# Patient Record
Sex: FEMALE | Race: WHITE | Employment: OTHER | URBAN - METROPOLITAN AREA
[De-identification: names, ages, dates, MRNs, and addresses within clinical notes are randomized per-mention and may not be internally consistent; named-entity substitution may affect disease eponyms.]

---

## 2017-07-24 ENCOUNTER — TRANSFERRED RECORDS (OUTPATIENT)
Dept: HEALTH INFORMATION MANAGEMENT | Facility: CLINIC | Age: 57
End: 2017-07-24

## 2017-09-26 ENCOUNTER — MEDICAL CORRESPONDENCE (OUTPATIENT)
Dept: HEALTH INFORMATION MANAGEMENT | Facility: CLINIC | Age: 57
End: 2017-09-26

## 2017-12-26 ENCOUNTER — TRANSFERRED RECORDS (OUTPATIENT)
Dept: HEALTH INFORMATION MANAGEMENT | Facility: CLINIC | Age: 57
End: 2017-12-26

## 2018-03-02 ENCOUNTER — TRANSFERRED RECORDS (OUTPATIENT)
Dept: HEALTH INFORMATION MANAGEMENT | Facility: CLINIC | Age: 58
End: 2018-03-02

## 2018-06-20 ENCOUNTER — TRANSFERRED RECORDS (OUTPATIENT)
Dept: HEALTH INFORMATION MANAGEMENT | Facility: CLINIC | Age: 58
End: 2018-06-20

## 2018-07-20 ENCOUNTER — TRANSFERRED RECORDS (OUTPATIENT)
Dept: HEALTH INFORMATION MANAGEMENT | Facility: CLINIC | Age: 58
End: 2018-07-20

## 2018-10-24 ENCOUNTER — TRANSFERRED RECORDS (OUTPATIENT)
Dept: HEALTH INFORMATION MANAGEMENT | Facility: CLINIC | Age: 58
End: 2018-10-24

## 2018-11-16 ENCOUNTER — MEDICAL CORRESPONDENCE (OUTPATIENT)
Dept: HEALTH INFORMATION MANAGEMENT | Facility: CLINIC | Age: 58
End: 2018-11-16

## 2018-11-16 ENCOUNTER — TRANSFERRED RECORDS (OUTPATIENT)
Dept: HEALTH INFORMATION MANAGEMENT | Facility: CLINIC | Age: 58
End: 2018-11-16

## 2018-12-06 ENCOUNTER — MEDICAL CORRESPONDENCE (OUTPATIENT)
Dept: HEALTH INFORMATION MANAGEMENT | Facility: CLINIC | Age: 58
End: 2018-12-06

## 2019-01-03 ENCOUNTER — PATIENT OUTREACH (OUTPATIENT)
Dept: CARE COORDINATION | Facility: CLINIC | Age: 59
End: 2019-01-03

## 2019-03-21 ENCOUNTER — OFFICE VISIT (OUTPATIENT)
Dept: NEUROSURGERY | Facility: CLINIC | Age: 59
End: 2019-03-21
Payer: MEDICARE

## 2019-03-21 VITALS
HEART RATE: 98 BPM | WEIGHT: 195.4 LBS | TEMPERATURE: 98 F | OXYGEN SATURATION: 97 % | HEIGHT: 68 IN | RESPIRATION RATE: 18 BRPM | DIASTOLIC BLOOD PRESSURE: 83 MMHG | SYSTOLIC BLOOD PRESSURE: 146 MMHG | BODY MASS INDEX: 29.61 KG/M2

## 2019-03-21 DIAGNOSIS — M51.16 LUMBAR DISC DISEASE WITH RADICULOPATHY: Primary | ICD-10-CM

## 2019-03-21 PROBLEM — M50.10 CERVICAL DISC DISORDER WITH RADICULOPATHY OF CERVICAL REGION: Status: ACTIVE | Noted: 2017-08-04

## 2019-03-21 PROBLEM — Z98.1 S/P LUMBAR FUSION: Status: ACTIVE | Noted: 2018-06-01

## 2019-03-21 PROBLEM — E04.1 THYROID NODULE: Status: ACTIVE | Noted: 2018-11-16

## 2019-03-21 PROBLEM — J44.9 COPD (CHRONIC OBSTRUCTIVE PULMONARY DISEASE) (H): Status: ACTIVE | Noted: 2018-11-16

## 2019-03-21 PROBLEM — G47.33 OSA (OBSTRUCTIVE SLEEP APNEA): Status: ACTIVE | Noted: 2018-11-16

## 2019-03-21 PROBLEM — K58.9 IBS (IRRITABLE BOWEL SYNDROME): Status: ACTIVE | Noted: 2018-11-16

## 2019-03-21 PROBLEM — R49.0 HOARSENESS: Status: ACTIVE | Noted: 2018-11-16

## 2019-03-21 RX ORDER — ALBUTEROL SULFATE 1.25 MG/3ML
SOLUTION RESPIRATORY (INHALATION) PRN
COMMUNITY

## 2019-03-21 RX ORDER — ALBUTEROL SULFATE 90 UG/1
2 AEROSOL, METERED RESPIRATORY (INHALATION) 4 TIMES DAILY
COMMUNITY

## 2019-03-21 RX ORDER — TRIAMCINOLONE ACETONIDE 1 MG/G
CREAM TOPICAL PRN
COMMUNITY

## 2019-03-21 RX ORDER — METRONIDAZOLE 500 MG/1
500 TABLET ORAL PRN
COMMUNITY

## 2019-03-21 RX ORDER — ESTRADIOL 10 UG/1
10 INSERT VAGINAL PRN
COMMUNITY

## 2019-03-21 RX ORDER — SULFAMETHOXAZOLE/TRIMETHOPRIM 800-160 MG
1 TABLET ORAL PRN
COMMUNITY

## 2019-03-21 SDOH — HEALTH STABILITY: MENTAL HEALTH: HOW OFTEN DO YOU HAVE A DRINK CONTAINING ALCOHOL?: NEVER

## 2019-03-21 ASSESSMENT — MIFFLIN-ST. JEOR: SCORE: 1516.89

## 2019-03-21 ASSESSMENT — PAIN SCALES - GENERAL: PAINLEVEL: NO PAIN (0)

## 2019-03-21 NOTE — LETTER
3/21/2019       RE: Lory Duncan  [address]  [facility] [address]     Dear Colleague,    Thank you for referring your patient, Lory Duncan, to the [facility] NEUROSURGERY at [facility]. Please see a copy of my visit note below.      Service Date: 03/21/2019      CHIEF COMPLAINT:  Leg pain, status post lumbar surgery.      HISTORY OF PRESENT ILLNESS:  Ms. Duncan is a 58-year-old woman and heavy smoker who has a history of lateral interbody fusion at L5-S1 level done by Dr. Nice on 03/03/2018.  The surgery was done for right leg radiculopathy.  Immediately after the surgery, she had left leg pain which has been persistent ever since.  The pain goes along the L5 distribution.  She also reports numbness involving all of the foot.  She underwent an MRI and then a CT myelogram for evaluation of her pain and was seen by Dr. Nice.  She was told that her hardware was intact and that she was not a surgical candidate. The patient was then referred to Neurosurgery at [facility] for further evaluation.  She denies any bowel or bladder issues.  She is very debilitated because of her pain and is very frustrated.      PAST MEDICAL HISTORY:   1.  COPD.   2.  Degenerative joint disease.     3.  Neck pain.   4.  Sleep apnea.   5.  Thyroid disease.      PAST SURGICAL HISTORY:   1.  Hysterectomy.   2.  Laryngoscopy.   3.  Previous back surgery.      MEDICATIONS:   1.  Flexeril.   2. Hydrocodone.   3.  Albuterol.      PHYSICAL EXAMINATION:  The patient is awake, alert and oriented x3.  Cranial nerves are grossly intact.  She has 5/5 upper and lower extremity strength bilaterally.  Sensation reveals numbness in the left foot.  Reflexes are physiologic and symmetric.  Gait is within normal limits.  She has 3 incisions all healed.        IMAGING: CT myelogram on 10/24/18 reveals postsurgical changes at L5-S1. There's a fragment at the left facet joint in proximity to the L5 nerve root. This could  be a bone fragment or disk material.    ASSESSMENT/PLAN:  Ms. Mccoy is a 58 year old woman with left L5 radiculopathy after an L5-S1 OLIF. We discussed that her pain is most likely related to the fragment seen on CT myelogram. We would consider doing a transforaminal selective nerve block of left L5 to confirm the diagnosis. Potentially, she may benefit from surgery which will involve removal of left sided instrumentation including rods and screws, total facetectomy, and replacement of the instrumentation. Patient agrees to proceed. She'll come back to clinic once her nerve root block is performed.     Addendum:  I have seen this patient and agree with this note. JOHNSON JACOBSEN MD       As dictated by WILFRID ALFORD MD            D: 2019   T: 2019   MT: lorna      Name:     HERON MCCOY   MRN:      [id number]        Account:      [id number]   :      1960           Service Date: 2019      Document: [id number]

## 2019-03-21 NOTE — NURSING NOTE
Chief Complaint   Patient presents with     New Patient     [facility] NEW PATIENT CONSULTATION VISIT FOR POST OP COMPLICATIONS        Abdi Wu MA

## 2019-03-21 NOTE — PROGRESS NOTES
Service Date: 03/21/2019      CHIEF COMPLAINT:  Leg pain, status post lumbar surgery.      HISTORY OF PRESENT ILLNESS:  Ms. Duncan is a 58-year-old woman and heavy smoker who has a history of lateral interbody fusion at L5-S1 level done by Dr. Nice on 03/03/2018.  The surgery was done for right leg radiculopathy.  Immediately after the surgery, she had left leg pain which has been persistent ever since.  The pain goes along the L5 distribution.  She also reports numbness involving all of the foot.  She underwent an MRI and then a CT myelogram for evaluation of her pain and was seen by Dr. Nice.  She was told that her hardware was intact and that she was not a surgical candidate. The patient was then referred to Neurosurgery at [facility] for further evaluation.  She denies any bowel or bladder issues.  She is very debilitated because of her pain and is very frustrated.      PAST MEDICAL HISTORY:   1.  COPD.   2.  Degenerative joint disease.     3.  Neck pain.   4.  Sleep apnea.   5.  Thyroid disease.      PAST SURGICAL HISTORY:   1.  Hysterectomy.   2.  Laryngoscopy.   3.  Previous back surgery.      MEDICATIONS:   1.  Flexeril.   2. Hydrocodone.   3.  Albuterol.      PHYSICAL EXAMINATION:  The patient is awake, alert and oriented x3.  Cranial nerves are grossly intact.  She has 5/5 upper and lower extremity strength bilaterally.  Sensation reveals numbness in the left foot.  Reflexes are physiologic and symmetric.  Gait is within normal limits.  She has 3 incisions all healed.        IMAGING: CT myelogram on 10/24/18 reveals postsurgical changes at L5-S1. There's a fragment at the left facet joint in proximity to the L5 nerve root. This could be a bone fragment or disk material.    ASSESSMENT/PLAN:  Ms. Duncan is a 58 year old woman with left L5 radiculopathy after an L5-S1 OLIF. We discussed that her pain is most likely related to the fragment seen on CT myelogram. We would consider doing a transforaminal selective nerve  block of left L5 to confirm the diagnosis. Potentially, she may benefit from surgery which will involve removal of left sided instrumentation including rods and screws, total facetectomy, and replacement of the instrumentation. Patient agrees to proceed. She'll come back to clinic once her nerve root block is performed.     Addendum:  I have seen this patient and agree with this note. JOHNSON JACOBSEN MD       As dictated by WILFRID ALFORD MD            D: 2019   T: 2019   MT: lorna      Name:     HERON MCCOY   MRN:      [id number]        Account:      [id number]   :      1960           Service Date: 2019      Document: [id number]

## 2019-03-21 NOTE — PATIENT INSTRUCTIONS
Thank you for choosing [facility] for your care.    Dr. Holman would like to send you to [facility] for a selective nerve root block of L5 on the left side.  We have written the orders and sent them over to [facility] in [place] at your request.     [address]  [place] [address]  [phone]    They should contact you to schedule. However, if they do not contact you within the next few days, please reach out to them to schedule.    Please contact Macario Kaur RN with questions/concerns      Thank you for trusting us with your care.

## 2019-03-22 ENCOUNTER — HEALTH MAINTENANCE LETTER (OUTPATIENT)
Age: 59
End: 2019-03-22

## 2019-03-26 ENCOUNTER — TRANSFERRED RECORDS (OUTPATIENT)
Dept: HEALTH INFORMATION MANAGEMENT | Facility: CLINIC | Age: 59
End: 2019-03-26

## 2019-06-13 ENCOUNTER — OFFICE VISIT (OUTPATIENT)
Dept: NEUROSURGERY | Facility: CLINIC | Age: 59
End: 2019-06-13
Payer: MEDICARE

## 2019-06-13 VITALS
DIASTOLIC BLOOD PRESSURE: 82 MMHG | TEMPERATURE: 98 F | HEART RATE: 109 BPM | HEIGHT: 68 IN | RESPIRATION RATE: 16 BRPM | SYSTOLIC BLOOD PRESSURE: 129 MMHG | BODY MASS INDEX: 29.86 KG/M2 | OXYGEN SATURATION: 95 % | WEIGHT: 197 LBS

## 2019-06-13 DIAGNOSIS — Z01.818 PRE-OP EVALUATION: ICD-10-CM

## 2019-06-13 DIAGNOSIS — M51.16 LUMBAR DISC DISEASE WITH RADICULOPATHY: Primary | ICD-10-CM

## 2019-06-13 RX ORDER — MODAFINIL 200 MG/1
200 TABLET ORAL EVERY MORNING
Status: ON HOLD | COMMUNITY
Start: 2019-04-15 | End: 2019-08-20

## 2019-06-13 ASSESSMENT — PAIN SCALES - GENERAL: PAINLEVEL: MODERATE PAIN (4)

## 2019-06-13 ASSESSMENT — MIFFLIN-ST. JEOR: SCORE: 1524.15

## 2019-06-13 NOTE — NURSING NOTE
Chief Complaint   Patient presents with     RECHECK     [facility] RETURN F/U LUMBAR RADICULOPATHY        Ladarius Cohen, EMT

## 2019-06-13 NOTE — Clinical Note
"6/13/2019       RE: Lory Duncan  [address]  [facility] [address]     Dear Colleague,    Thank you for referring your patient, Lory Duncan, to the [facility] NEUROSURGERY at [facility]. Please see a copy of my visit note below.    6/13/2019  Neurosurgery Clinic Visit - Return    History of present illness:  Ms. Duncan is a 57 yo F with PMH right leg radiculopathy s/p L5-S1 fusion by Dr. Nice on 3/3/18, c/b left L5 radiculopathy presenting for follow-up. Left leg pain better after left L5 JENNA on 3/26/19 for 2 months, now worse. Left foot is numb. Has cramping in left leg.     Physical exam:   /82   Pulse 109   Temp 98  F (36.7  C) (Oral)   Resp 16   Ht 1.715 m (5' 7.5\")   Wt 89.4 kg (197 lb)   SpO2 95%   BMI 30.40 kg/m       General: Awake and alert and in no acute distress.  Pulm: Breathing comfortably on room air  Motor: 5 out of 5 strength in bilateral upper and lower extremities  Sensation: Numbness in left foot    Imaging:  No new imaging    Assessment:    # PMH right leg radiculopathy s/p L5-S1 fusion by Dr. Nice on 3/3/18, c/b left L5 radiculopathy, better with JENNA, now recurring pain    Plan:    - Plan for repeat left L5 JENNA  - Plan for surgery - removal of left sided instrumentation including rods and screws, total facetectomy, and replacement of the instrumentation      Patient seen and discussed with Dr. Shannan Murray MD  Neurosurgery Resident PGY1      Again, thank you for allowing me to participate in the care of your patient.      Sincerely,    Shannan Holman MD    "

## 2019-06-13 NOTE — LETTER
Date:June 25, 2019      Patient was self referred, no letter generated. Do not send.        [facility] Health Information

## 2019-06-17 ENCOUNTER — PATIENT OUTREACH (OUTPATIENT)
Dept: NEUROSURGERY | Facility: CLINIC | Age: 59
End: 2019-06-17

## 2019-06-17 NOTE — PROGRESS NOTES
Pre-op packet sent via US Mail with instructions to call upon receipt.    PAC:  8/6/19  DOS: 8/19/19

## 2019-06-21 ENCOUNTER — TRANSFERRED RECORDS (OUTPATIENT)
Dept: HEALTH INFORMATION MANAGEMENT | Facility: CLINIC | Age: 59
End: 2019-06-21

## 2019-07-09 ENCOUNTER — PATIENT OUTREACH (OUTPATIENT)
Dept: NEUROSURGERY | Facility: CLINIC | Age: 59
End: 2019-07-09

## 2019-07-09 NOTE — PROGRESS NOTES
Phone call to patient in response to voice mail message requesting call back in regards to appointment times.   No answer at given number.  Un able to leave message.  Patient requests no voice mail message be left.

## 2019-07-19 ENCOUNTER — PRE VISIT (OUTPATIENT)
Dept: SURGERY | Facility: CLINIC | Age: 59
End: 2019-07-19

## 2019-07-19 NOTE — TELEPHONE ENCOUNTER
Action 2019 7:51am PP   Action Taken Faxed another request to [facility] for EKG.          FUTURE VISIT INFORMATION      SURGERY INFORMATION:    Date: 19    Location: [facility] OR    Surgeon:  Shannan Holman    Anesthesia Type:  General    RECORDS REQUESTED FROM:       Primary Care Provider: Herb Petit-Livan    Pertinent Medical History: COPD,ELIUD    Most recent EKG+ Tracin17- [facility]- requested tracing

## 2019-08-05 ENCOUNTER — TELEPHONE (OUTPATIENT)
Dept: NEUROSURGERY | Facility: CLINIC | Age: 59
End: 2019-08-05

## 2019-08-05 NOTE — TELEPHONE ENCOUNTER
"----- Message from Lucia Burks LPN sent at 2019 12:52 PM CDT -----  Regarding: Pre-op care coordination  This patient left two messages on Macario's voicemail. One is from last Thursday night. She is having surgery with Dr. Holman on , is from out of town and \"Kev\" (perhaps a friend or partner) would like to book a room at a motel/hotel. She would like recommendations on where to stay.    The second message is from this morning. She has a PAC appt scheduled for tomorrow morning. Her uncle  and the  is on Wednesday. However, she needs to leave tomorrow in order to travel to the  locale. She was wondering if she could reschedule her PAC appt for for next week.     Please call her back and advise.    Thank you,  Lucia"

## 2019-08-05 NOTE — TELEPHONE ENCOUNTER
Callback to patient,  Left message explaining Yes, can change PAC or pre op appointment   Call  to change appointment to a different date/time.    Call back for any issues  [phone].

## 2019-08-10 ENCOUNTER — MYC MEDICAL ADVICE (OUTPATIENT)
Dept: NEUROSURGERY | Facility: CLINIC | Age: 59
End: 2019-08-10

## 2019-08-14 ENCOUNTER — ANESTHESIA EVENT (OUTPATIENT)
Dept: SURGERY | Facility: CLINIC | Age: 59
DRG: 517 | End: 2019-08-14
Payer: MEDICARE

## 2019-08-14 ENCOUNTER — OFFICE VISIT (OUTPATIENT)
Dept: SURGERY | Facility: CLINIC | Age: 59
End: 2019-08-14
Attending: NEUROLOGICAL SURGERY
Payer: MEDICARE

## 2019-08-14 VITALS
BODY MASS INDEX: 31.06 KG/M2 | DIASTOLIC BLOOD PRESSURE: 87 MMHG | RESPIRATION RATE: 19 BRPM | TEMPERATURE: 98 F | WEIGHT: 197.9 LBS | HEIGHT: 67 IN | SYSTOLIC BLOOD PRESSURE: 131 MMHG | OXYGEN SATURATION: 96 % | HEART RATE: 106 BPM

## 2019-08-14 DIAGNOSIS — M51.16 LUMBAR DISC DISEASE WITH RADICULOPATHY: ICD-10-CM

## 2019-08-14 DIAGNOSIS — Z01.818 PRE-OP EVALUATION: ICD-10-CM

## 2019-08-14 DIAGNOSIS — Z01.818 PRE-OP EXAMINATION: Primary | ICD-10-CM

## 2019-08-14 LAB
ANION GAP SERPL CALCULATED.3IONS-SCNC: 4 MMOL/L (ref 3–14)
BUN SERPL-MCNC: 21 MG/DL (ref 7–30)
CALCIUM SERPL-MCNC: 8.6 MG/DL (ref 8.5–10.1)
CHLORIDE SERPL-SCNC: 106 MMOL/L (ref 94–109)
CO2 SERPL-SCNC: 30 MMOL/L (ref 20–32)
CREAT SERPL-MCNC: 0.74 MG/DL (ref 0.52–1.04)
ERYTHROCYTE [DISTWIDTH] IN BLOOD BY AUTOMATED COUNT: 12.8 % (ref 10–15)
GFR SERPL CREATININE-BSD FRML MDRD: 90 ML/MIN/{1.73_M2}
GLUCOSE SERPL-MCNC: 101 MG/DL (ref 70–99)
HCT VFR BLD AUTO: 48 % (ref 35–47)
HGB BLD-MCNC: 15.2 G/DL (ref 11.7–15.7)
MCH RBC QN AUTO: 31.6 PG (ref 26.5–33)
MCHC RBC AUTO-ENTMCNC: 31.7 G/DL (ref 31.5–36.5)
MCV RBC AUTO: 100 FL (ref 78–100)
PLATELET # BLD AUTO: 205 10E9/L (ref 150–450)
POTASSIUM SERPL-SCNC: 4.1 MMOL/L (ref 3.4–5.3)
RBC # BLD AUTO: 4.81 10E12/L (ref 3.8–5.2)
SODIUM SERPL-SCNC: 139 MMOL/L (ref 133–144)
WBC # BLD AUTO: 7.6 10E9/L (ref 4–11)

## 2019-08-14 RX ORDER — ALBUTEROL SULFATE 0.83 MG/ML
2.5 SOLUTION RESPIRATORY (INHALATION) ONCE
Status: CANCELLED | OUTPATIENT
Start: 2019-08-14 | End: 2019-08-14

## 2019-08-14 ASSESSMENT — COPD QUESTIONNAIRES
CAT_SEVERITY: MILD
COPD: 1

## 2019-08-14 ASSESSMENT — MIFFLIN-ST. JEOR: SCORE: 1520.3

## 2019-08-14 ASSESSMENT — LIFESTYLE VARIABLES: TOBACCO_USE: 1

## 2019-08-14 ASSESSMENT — ENCOUNTER SYMPTOMS: SEIZURES: 0

## 2019-08-14 ASSESSMENT — PAIN SCALES - GENERAL: PAINLEVEL: MODERATE PAIN (4)

## 2019-08-14 NOTE — ANESTHESIA PREPROCEDURE EVALUATION
"Anesthesia Pre-Procedure Evaluation    Patient: Lory Duncan   MRN:     [id number] Gender:   female   Age:    58 year old :      1960        Preoperative Diagnosis: Lumbar Disc Disease With Radiculopathy   Procedure(s):  Removal Of Left Sided Instrumentation At Lumbar 5-Sacral 1, Total Facetectomy, Replacement Of Instrumentation **Latex Allergy**     Past Medical History:   Diagnosis Date     COPD (chronic obstructive pulmonary disease) (H)      IBS (irritable bowel syndrome)      ELIUD (obstructive sleep apnea)      Tobacco abuse       Past Surgical History:   Procedure Laterality Date     BLADDER SURGERY      sling for prolapse     BREAST SURGERY      mammory gland removed on right     CARPAL TUNNEL RELEASE RT/LT Left      CARPAL TUNNEL RELEASE RT/LT Right      EXTRACTION(S) DENTAL       FUSION CERVICAL ANTERIOR ONE LEVEL       FUSION SPINE POSTERIOR ONE LEVEL  2018    L5-S1     HYSTERECTOMY       LARYNGOSCOPY       RELEASE DEQUERVAINS WRIST       SURGICAL PATHOLOGY EXAM            Anesthesia Evaluation     . Pt has had prior anesthetic. Type: General    History of anesthetic complications (\"I have to eat right away when I wake up from anesthesia or my stomach doesn't feel good.\")   - PONV        ROS/MED HX    ENT/Pulmonary:     (+)sleep apnea (Sleep study in  - diagnosed with sleep apnea and recommended BiPAP), tobacco use (57 year pack history ), Current use 1.5 packs/day  mild COPD, doesn't use CPAP , . .    Neurologic:  - neg neurologic ROS    (-) seizures, CVA and migraines   Cardiovascular:  - neg cardiovascular ROS   (+) ----. : . . . :. . Previous cardiac testing date:results:date: results:ECG reviewed date: results:NSR date: results:          METS/Exercise Tolerance: Comment: Goes up and down 12 stairs multiple times a day without issues 4 - Raking leaves, gardening   Hematologic:        (-) history of blood clots, anemia and History of Transfusion   Musculoskeletal:   (+)  other " musculoskeletal- DJD      GI/Hepatic:     (+) Other GI/Hepatic IBS - diarrhea      (-) GERD   Renal/Genitourinary:  - ROS Renal section negative       Endo:     (+) thyroid problem (History of hyperthyroid - treated. Now with thyroid nodules ) Obesity, .      Psychiatric:  - neg psychiatric ROS       Infectious Disease:  - neg infectious disease ROS       Malignancy:      - no malignancy   Other:    (+) No chance of pregnancy no H/O Chronic Pain,no other significant disability                        PHYSICAL EXAM:   Mental Status/Neuro: A/A/O; Age Appropriate   Airway: Facies: Feasible  Mallampati: II  Mouth/Opening: Full  TM distance: > 6 cm  Neck ROM: Full   Respiratory: Auscultation: CTAB     Resp. Rate: Normal     Resp. Effort: Normal      CV:   Rate: Tachy  Heart: Normal Sounds  Edema: None  Pulses: Normal   Comments: Well healed anterior fusion neck scar     Dental: Dentures  Dentures: Complete                Results for HERON MCCOY (MRN [id number]) as of 8/14/2019 12:53   Ref. Range 8/14/2019 12:23   Sodium Latest Ref Range: 133 - 144 mmol/L 139   Potassium Latest Ref Range: 3.4 - 5.3 mmol/L 4.1   Chloride Latest Ref Range: 94 - 109 mmol/L 106   Carbon Dioxide Latest Ref Range: 20 - 32 mmol/L 30   Urea Nitrogen Latest Ref Range: 7 - 30 mg/dL 21   Creatinine Latest Ref Range: 0.52 - 1.04 mg/dL 0.74   GFR Estimate Latest Ref Range: >60 mL/min/1.73_m2 90   GFR Estimate If Black Latest Ref Range: >60 mL/min/1.73_m2 >90   Calcium Latest Ref Range: 8.5 - 10.1 mg/dL 8.6   Anion Gap Latest Ref Range: 3 - 14 mmol/L 4   Glucose Latest Ref Range: 70 - 99 mg/dL 101 (H)   WBC Latest Ref Range: 4.0 - 11.0 10e9/L 7.6   Hemoglobin Latest Ref Range: 11.7 - 15.7 g/dL 15.2   Hematocrit Latest Ref Range: 35.0 - 47.0 % 48.0 (H)   Platelet Count Latest Ref Range: 150 - 450 10e9/L 205   RBC Count Latest Ref Range: 3.8 - 5.2 10e12/L 4.81   MCV Latest Ref Range: 78 - 100 fl 100   MCH Latest Ref Range: 26.5 - 33.0 pg 31.6  "  MCHC Latest Ref Range: 31.5 - 36.5 g/dL 31.7   RDW Latest Ref Range: 10.0 - 15.0 % 12.8     Preop Vitals    BP Readings from Last 3 Encounters:   08/14/19 131/87   06/13/19 129/82   03/21/19 146/83    Pulse Readings from Last 3 Encounters:   08/14/19 106   06/13/19 109   03/21/19 98      Resp Readings from Last 3 Encounters:   08/14/19 19   06/13/19 16   03/21/19 18    SpO2 Readings from Last 3 Encounters:   08/14/19 96%   06/13/19 95%   03/21/19 97%      Temp Readings from Last 1 Encounters:   08/14/19 98  F (36.7  C) (Oral)    Ht Readings from Last 1 Encounters:   08/14/19 1.702 m (5' 7\")      Wt Readings from Last 1 Encounters:   08/14/19 89.8 kg (197 lb 14.4 oz)    Estimated body mass index is 31 kg/m  as calculated from the following:    Height as of this encounter: 1.702 m (5' 7\").    Weight as of this encounter: 89.8 kg (197 lb 14.4 oz).     LDA:        Assessment:   ASA SCORE: 2      Smoking Status:  Active Smoker        Plan:   Anes. Type:  General   Pre-Medication: None   Induction:  IV (Standard)   Airway: ETT; Oral   Access/Monitoring: PIV   Maintenance: Balanced     Postop Plan:   Postop Pain: Opioids  Postop Sedation/Airway: Not planned  Disposition: Outpatient     PONV Management:   Adult Risk Factors: Female, Postop Opioids   Prevention: Ondansetron, Dexamethasone     CONSENT: Direct conversation   Plan and risks discussed with: Patient   Blood Products: Consented (ALL Blood Products)                PAC Discussion and Assessment    ASA Classification: 2  Case is suitable for: [facility]  Anesthetic techniques and relevant risks discussed: GA  Invasive monitoring and risk discussed:   Types:   Possibility and Risk of blood transfusion discussed:   NPO instructions given:   Additional anesthetic preparation and risks discussed:   Needs early admission to pre-op area:   Other:     PAC Resident/NP Anesthesia Assessment:  Lory is a 58 year old woman who is scheduled for Removal Of Left Sided " Instrumentation At Lumbar 5-Sacral 1, Total Facetectomy, Replacement Of Instrumentation on 8/19/19 by Dr. Holman in treatment of lumbar disc disease with radiculopathy.  PAC referral for risk assessment and optimization for anesthesia with comorbid conditions of COPD, ELIUD, smoker, history of hyperthyoridism, IBS, obesity, DJD:    Pre-operative considerations:  1.  Cardiac:  Functional status- METS 4, patient walks up 12 stairs multiple times a day.  Intermediate risk surgery with 0.4% (RCRI #) risk of major adverse cardiac event. EKG in 2017 shows NSR. She has acceptable exercise tolerance and denies any cardiac symptoms. No further cardiac testing indicated.     2.  Pulm:  Airway feasible.  ELIUD - patient had testing in 2010 with diagnosis of sleep apnea with recommendation for BiPAP. Per the patient her insurance won't cover the cost of a CPAP because she didn't have true apneic events just desaturation. She has COPD and uses her Albuterol inhaler daily but hasn't needed the nebulizer recently. She is a smoker, 1.5 ppd with a 57 pack year history. We discussed smoking cessation and the patient does have nicotine patches and lozenges. She is willing to think about quitting at least the day of surgery.   ~ She reports seasonal allergies in the spring. She continues to have some runny nose but denies any fevers or chills. She has a chronic cough from her smoking.     3. Endo: History of hyperthyroidism treated and now with normal thyroid function. Followed by her PCP. TSH 1.14 and T4 0.9 on 1/19/17  ~ Obesity, BMI 31. Consideration for safe lifting techniques    4. GI:  Risk of PONV score = 2.  If > 2, anti-emetic intervention recommended.  ~ IBS - diarrhea type. She takes imodium as needed. She can continue DOS.     5. Psych: Fatigue - followed by PCP and unsure if associated with ELIUD as well. Currently has Provigil PRN. She should hold DOS.     6. Musculoskeletal: Back pain with right foot numbness. Patient also  reports she has a tumor on the bottom of her right foot - procedure as above.     VTE risk: 0.26%    Patient is optimized and is acceptable candidate for the proposed procedure.  No further diagnostic evaluation is needed.     Patient discussed with Dr. Day.     For further details of assessment, testing, and physical exam please see H and P completed on same date.    Loni Norton PA-C        Mid-Level Provider/Resident: Loin Norton PA-C  Date: 8/14/19  Time:     Attending Anesthesiologist Anesthesia Assessment:        Anesthesiologist:   Date:   Time:   Pass/Fail:   Disposition:     PAC Pharmacist Assessment:        Pharmacist:   Date:   Time:    Loni Norton PA-C

## 2019-08-14 NOTE — H&P
Pre-Operative H & P     CC:  Preoperative exam to assess for increased cardiopulmonary risk while undergoing surgery and anesthesia.    Date of Encounter: 8/14/2019  Primary Care Physician:  Herb Petit     Reason for visit: pre operative examination, Lumbar disc disease with radiculopathy     HPI  Lory Duncan is a 58 year old female who presents for pre-operative H & P in preparation for Removal of left sided instrumentation at L5/S1, total facetectomy, replacement of instrumentation with Dr. Holman on 8/19/19 at [facility].     The patient is a 58 year old woman who has a history of lower back pain that radiates to her lower legs. She had a L5-S1 fusion in the past which improved her symptoms on her right leg. 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 She continues to have pain that radiates down to her left leg. She has associated numbness and tingling. She was seen by dr. Holman on 3/21/19 for further management. They reviewed her imaging and discussed treatment options. She had JENNA and then returned on 6/13/19 as the patient had returned. The plan was for repeat JENNA and then the procedure as above.     History is obtained from the patient and chart review    Past Medical History  Past Medical History:   Diagnosis Date     COPD (chronic obstructive pulmonary disease) (H)      DJD (degenerative joint disease)      Fatigue      IBS (irritable bowel syndrome)      ELIUD (obstructive sleep apnea)      Tobacco abuse        Past Surgical History  Past Surgical History:   Procedure Laterality Date     BLADDER SURGERY      sling for prolapse     BREAST SURGERY      mammory gland removed on right     CARPAL TUNNEL RELEASE RT/LT Left      CARPAL TUNNEL RELEASE RT/LT Right      EXTRACTION(S) DENTAL       FUSION CERVICAL ANTERIOR ONE LEVEL       FUSION SPINE POSTERIOR ONE LEVEL  03/03/2018     "L5-S1     HYSTERECTOMY       LARYNGOSCOPY       RELEASE DEQUERVAINS WRIST       SURGICAL PATHOLOGY EXAM         Hx of Blood transfusions/reactions: none     Hx of abnormal bleeding or anti-platelet use: none    Menstrual history: No LMP recorded. Patient is postmenopausal.: s/p hysterectomy    Steroid use in the last year: none    Personal or FH with difficulty with Anesthesia:  PONV - \"I have to eat right away when I wake up from anesthesia or my stomach doesn't feel good.\"    Prior to Admission Medications  Current Outpatient Medications   Medication Sig Dispense Refill     albuterol (PROAIR HFA) 108 (90 Base) MCG/ACT inhaler Inhale 2 puffs into the lungs 4 times daily        HYDROcodone-acetaminophen (LORTAB)  MG/15ML solution Take 3.75 mLs by mouth as needed for severe pain (Pt. last took approximately 8 days ago 08/14/2019)        hypromellose (ISOPTO TEARS) 0.5 % SOLN ophthalmic solution 1-2 drops as needed       Loperamide HCl (IMODIUM A-D PO) Take 2-3 tablets by mouth as needed (Pt. last took 08/13/2019)       modafinil (PROVIGIL) 200 MG tablet Take 200 mg by mouth every morning        sulfamethoxazole-trimethoprim (BACTRIM DS) 800-160 MG tablet Take 1 tablet by mouth as needed (Pt. has not taken in approximately 1 month 08/14/2019)        triamcinolone (KENALOG) 0.1 % external cream Apply topically as needed (Pt. has not used in approximately 1 month ago 08/14/2019)        albuterol (ACCUNEB) 1.25 MG/3ML neb solution Inhale into the lungs as needed       estradiol (VAGIFEM) 10 MCG TABS vaginal tablet Place 10 mcg vaginally as needed       metroNIDAZOLE (FLAGYL) 500 MG tablet Take 500 mg by mouth as needed       SALINE NASAL SPRAY NA [place] in nostril as needed         Allergies  Allergies   Allergen Reactions     Cephalexin Hives     Clarithromycin Hives     Pregabalin Anaphylaxis     Adhesive Tape Itching     No Clinical Screening - See Comments Itching     Patient states she is allergic to metal     " Oxycodone Itching     Carbamazepine Nausea and Vomiting     Latex Rash     Topiramate Nausea and Vomiting       Social History  Social History     Socioeconomic History     Marital status:      Spouse name: Not on file     Number of children: Not on file     Years of education: Not on file     Highest education level: Not on file   Occupational History     Not on file   Social Needs     Financial resource strain: Not on file     Food insecurity:     Worry: Not on file     Inability: Not on file     Transportation needs:     Medical: Not on file     Non-medical: Not on file   Tobacco Use     Smoking status: Current Every Day Smoker     Packs/day: 1.50     Years: 38.50     Pack years: 57.75     Smokeless tobacco: Never Used   Substance and Sexual Activity     Alcohol use: No     Frequency: Never     Comment: OCCASIONALLY     Drug use: No     Sexual activity: Not on file   Lifestyle     Physical activity:     Days per week: Not on file     Minutes per session: Not on file     Stress: Not on file   Relationships     Social connections:     Talks on phone: Not on file     Gets together: Not on file     Attends Orthodoxy service: Not on file     Active member of club or organization: Not on file     Attends meetings of clubs or organizations: Not on file     Relationship status: Not on file     Intimate partner violence:     Fear of current or ex partner: Not on file     Emotionally abused: Not on file     Physically abused: Not on file     Forced sexual activity: Not on file   Other Topics Concern     Not on file   Social History Narrative     Not on file       Family History  Family History   Problem Relation Age of Onset     Hypoglycemia Father        Review of Systems    ROS/MED HX    ENT/Pulmonary:     (+)sleep apnea (Sleep study in 2010 - diagnosed with sleep apnea and recommended BiPAP), tobacco use (57 year pack history ), Current use 1.5 packs/day  mild COPD, doesn't use CPAP , . .    Neurologic:  - neg  "neurologic ROS    (-) seizures, CVA and migraines   Cardiovascular:  - neg cardiovascular ROS   (+) ----. : . . . :. . Previous cardiac testing date:results:date: results:ECG reviewed date:2017 results:NSR date: results:          METS/Exercise Tolerance: Comment: Goes up and down 12 stairs multiple times a day without issues 4 - Raking leaves, gardening   Hematologic:        (-) history of blood clots, anemia and History of Transfusion   Musculoskeletal:   (+)  other musculoskeletal- DJD      GI/Hepatic:     (+) Other GI/Hepatic IBS - diarrhea       Renal/Genitourinary:  - ROS Renal section negative       Endo:     (+) thyroid problem (History of hyperthyroid - treated. Now with thyroid nodules ) .      Psychiatric:  - neg psychiatric ROS       Infectious Disease:  - neg infectious disease ROS       Malignancy:      - no malignancy   Other:    (+) No chance of pregnancy no H/O Chronic Pain,no other significant disability          The complete review of systems is negative other than noted in the HPI or here.   Temp: 98  F (36.7  C) Temp src: Oral BP: 131/87 Pulse: 106   Resp: 19 SpO2: 96 %         197 lbs 14.4 oz  5' 7\"   Body mass index is 31 kg/m .       Physical Exam  Constitutional: Awake, alert, cooperative, no apparent distress, and appears stated age.  Eyes: Pupils equal, round and reactive to light, extra ocular muscles intact, sclera clear, conjunctiva normal.  HENT: Normocephalic, oral pharynx with moist mucus membranes, Dentures. No goiter appreciated.   Respiratory: Clear to auscultation bilaterally, no crackles or wheezing.  Cardiovascular: Regular rate and rhythm, normal S1 and S2, and no murmur noted.  Carotids +2, no bruits. No edema. Palpable pulses to radial  DP and PT arteries.   GI: Normal bowel sounds, soft, non-distended, non-tender, no masses palpated, no hepatosplenomegaly.    Lymph/Hematologic: No cervical lymphadenopathy and no supraclavicular lymphadenopathy.  Genitourinary:  defer  Skin: " Warm and dry.  No rashes at anticipated surgical site.   Musculoskeletal: Full ROM of neck. There is no redness, warmth, or swelling of the joints. Gross motor strength is normal.    Neurologic: Awake, alert, oriented to name, place and time. Cranial nerves II-XII are grossly intact. Gait is normal.   Neuropsychiatric: Calm, cooperative. Normal affect.     Labs: (personally reviewed)  Results for HERON MCCOY (MRN [id number]) as of 2019 12:53   Ref. Range 2019 12:23   Sodium Latest Ref Range: 133 - 144 mmol/L 139   Potassium Latest Ref Range: 3.4 - 5.3 mmol/L 4.1   Chloride Latest Ref Range: 94 - 109 mmol/L 106   Carbon Dioxide Latest Ref Range: 20 - 32 mmol/L 30   Urea Nitrogen Latest Ref Range: 7 - 30 mg/dL 21   Creatinine Latest Ref Range: 0.52 - 1.04 mg/dL 0.74   GFR Estimate Latest Ref Range: >60 mL/min/1.73_m2 90   GFR Estimate If Black Latest Ref Range: >60 mL/min/1.73_m2 >90   Calcium Latest Ref Range: 8.5 - 10.1 mg/dL 8.6   Anion Gap Latest Ref Range: 3 - 14 mmol/L 4   Glucose Latest Ref Range: 70 - 99 mg/dL 101 (H)   WBC Latest Ref Range: 4.0 - 11.0 10e9/L 7.6   Hemoglobin Latest Ref Range: 11.7 - 15.7 g/dL 15.2   Hematocrit Latest Ref Range: 35.0 - 47.0 % 48.0 (H)   Platelet Count Latest Ref Range: 150 - 450 10e9/L 205   RBC Count Latest Ref Range: 3.8 - 5.2 10e12/L 4.81   MCV Latest Ref Range: 78 - 100 fl 100   MCH Latest Ref Range: 26.5 - 33.0 pg 31.6   MCHC Latest Ref Range: 31.5 - 36.5 g/dL 31.7   RDW Latest Ref Range: 10.0 - 15.0 % 12.8     EK2017  Normal sinus rhythm    IMAGING: CT myelogram on 10/24/18 reveals postsurgical changes at L5-S1. There's a fragment at the left facet joint in proximity to the L5 nerve root. This could be a bone fragment or disk material.    The patient's records and results personally reviewed by this provider.     Outside records reviewed from: care everywhere     ASSESSMENT and PLAN  Heron is a 58 year old woman who is scheduled for Removal Of  Left Sided Instrumentation At Lumbar 5-Sacral 1, Total Facetectomy, Replacement Of Instrumentation on 8/19/19 by Dr. Holman in treatment of lumbar disc disease with radiculopathy.  PAC referral for risk assessment and optimization for anesthesia with comorbid conditions of COPD, ELIUD, smoker, history of hyperthyoridism, IBS, obesity, DJD:    Pre-operative considerations:  1.  Cardiac:  Functional status- METS 4, patient walks up 12 stairs multiple times a day.  Intermediate risk surgery with 0.4% (RCRI #) risk of major adverse cardiac event. EKG in 2017 shows NSR. She has acceptable exercise tolerance and denies any cardiac symptoms. No further cardiac testing indicated.     2.  Pulm:  Airway feasible.  ELIUD - patient had testing in 2010 with diagnosis of sleep apnea with recommendation for BiPAP. Per the patient her insurance won't cover the cost of a CPAP because she didn't have true apneic events just desaturation. She has COPD and uses her Albuterol inhaler daily but hasn't needed the nebulizer recently. She is a smoker, 1.5 ppd with a 57 pack year history. We discussed smoking cessation and the patient does have nicotine patches and lozenges. She is willing to think about quitting at least the day of surgery.   ~ She reports seasonal allergies in the spring. She continues to have some runny nose but denies any fevers or chills. She has a chronic cough from her smoking.     3. Endo: History of hyperthyroidism treated and now with normal thyroid function. Followed by her PCP. TSH 1.14 and T4 0.9 on 1/19/17  ~ Obesity, BMI 31. Consideration for safe lifting techniques    4. GI:  Risk of PONV score = 2.  If > 2, anti-emetic intervention recommended.  ~ IBS - diarrhea type. She takes imodium as needed. She can continue DOS.     5. Psych: Fatigue - followed by PCP and unsure if associated with ELIUD as well. Currently has Provigil PRN. She should hold DOS.     6. Musculoskeletal: Back pain with right foot numbness. Patient  also reports she has a tumor on the bottom of her right foot - procedure as above.     VTE risk: 0.26%      Patient was discussed with Dr Day.    The patient is optimized for their procedure. AVS with information on surgery time/arrival time, meds and NPO status given by nursing staff.        Loni Norton PA-C  Preoperative Assessment Center  [facility]  Clinic and [facility] Center  Phone: [phone]  Fax: [phone]

## 2019-08-14 NOTE — PATIENT INSTRUCTIONS
Preparing for Your Surgery      Name:  Lory Duncan   MRN:  [id number]   :  1960   Today's Date:  2019     Arriving for surgery:  Surgery date:  19  Arrival time:  10:00 am  Please come to:       [facility] [address]  [address] [place]  [address]    - ? parking is available in front of the hospital      -    Please proceed to [address] on the 3rd floor. [phone]?     - ?If you are in need of directions, wheelchair or escort please stop at the Information Desk in the lobby.  Inform the information person that you are here for surgery; a wheelchair and escort to [address] will be provided.?     What can I eat or drink?  -  You may have solid food or milk products until 8 hours prior to your surgery (4:00 am).  -  You may have water, apple juice or 7up/Sprite until 2 hours prior to your surgery (10:00 am).    Which medicines can I take?  Stop Aspirin, vitamins and supplements one week prior to surgery.  Hold Ibuprofen for 24 hours and/or Naproxen for 48 hours prior to surgery.   -  Do NOT take these medications in the morning, the day of surgery:  Modafinil (Provigil)    -  Please take these medications the day of surgery:  Albuterol (Proair)     Albuterol (Accuneb) if needed  Hydrocodone-Acetaminophen (Lortab)  Hypromellose (Isopto Tears) if needed       How do I prepare myself?  -  Take two showers: one the night before surgery; and one the morning of surgery.         Use Scrubcare or Hibiclens to wash from neck down.  You may use your own shampoo and conditioner. No other hair products.   -  Do NOT use lotion, powder, deodorant, or antiperspirant the day of your surgery.  -  Do NOT wear any makeup, fingernail polish or jewelry.  -  Do not bring your own medications to the hospital, except for your inhaler.  -  Bring your ID and insurance card.    Questions or Concerns:  -If you have questions or concerns regarding the day of surgery, please call  [phone].     -For questions after surgery please call your surgeons office.           AFTER YOUR SURGERY  Breathing exercises   Breathing exercises help you recover faster. Take deep breaths and let the air out slowly. This will:     Help you wake up after surgery.    Help prevent complications like pneumonia.  Preventing complications will help you go home sooner.   We may give you a breathing device (incentive spirometer) to encourage you to breathe deeply.   Nausea and vomiting   You may feel sick to your stomach after surgery; if so, let your nurse know.    Pain control:  After surgery, you may have pain. Our goal is to help you manage your pain. Pain medicine will help you feel comfortable enough to do activities that will help you heal.  These activities may include breathing exercises, walking and physical therapy.   To help your health care team treat your pain we will ask: 1) If you have pain  2) where it is located 3) describe your pain in your words  Methods of pain control include medications given by mouth, vein or by nerve block for some surgeries.  Sequential Compression Device (SCD) or Pneumo Boots:  You may need to wear SCD S on your legs or feet. These are wraps connected to a machine that pumps in air and releases it. The repeated pumping helps prevent blood clots from forming.

## 2019-08-19 ENCOUNTER — APPOINTMENT (OUTPATIENT)
Dept: GENERAL RADIOLOGY | Facility: CLINIC | Age: 59
DRG: 517 | End: 2019-08-19
Attending: NEUROLOGICAL SURGERY
Payer: MEDICARE

## 2019-08-19 ENCOUNTER — ANESTHESIA (OUTPATIENT)
Dept: SURGERY | Facility: CLINIC | Age: 59
DRG: 517 | End: 2019-08-19
Payer: MEDICARE

## 2019-08-19 ENCOUNTER — HOSPITAL ENCOUNTER (INPATIENT)
Facility: CLINIC | Age: 59
LOS: 1 days | Discharge: HOME | DRG: 517 | End: 2019-08-20
Attending: NEUROLOGICAL SURGERY | Admitting: NEUROLOGICAL SURGERY
Payer: MEDICARE

## 2019-08-19 DIAGNOSIS — M54.16 LUMBAR RADICULOPATHY: Primary | ICD-10-CM

## 2019-08-19 DIAGNOSIS — Z98.1 S/P LUMBAR FUSION: ICD-10-CM

## 2019-08-19 LAB
ABO + RH BLD: NORMAL
ABO + RH BLD: NORMAL
BLD GP AB SCN SERPL QL: NORMAL
BLOOD BANK CMNT PATIENT-IMP: NORMAL
GLUCOSE BLDC GLUCOMTR-MCNC: 90 MG/DL (ref 70–99)
SPECIMEN EXP DATE BLD: NORMAL

## 2019-08-19 PROCEDURE — 25000128 H RX IP 250 OP 636: Performed by: NEUROLOGICAL SURGERY

## 2019-08-19 PROCEDURE — 37000008 ZZH ANESTHESIA TECHNICAL FEE, 1ST 30 MIN: Performed by: NEUROLOGICAL SURGERY

## 2019-08-19 PROCEDURE — 12000001 ZZH R&B MED SURG/OB UMMC

## 2019-08-19 PROCEDURE — 40000277 XR SURGERY CARM FLUORO LESS THAN 5 MIN W STILLS: Mod: TC

## 2019-08-19 PROCEDURE — 71000015 ZZH RECOVERY PHASE 1 LEVEL 2 EA ADDTL HR: Performed by: NEUROLOGICAL SURGERY

## 2019-08-19 PROCEDURE — 25000125 ZZHC RX 250: Performed by: NEUROLOGICAL SURGERY

## 2019-08-19 PROCEDURE — C1713 ANCHOR/SCREW BN/BN,TIS/BN: HCPCS | Performed by: NEUROLOGICAL SURGERY

## 2019-08-19 PROCEDURE — 25000125 ZZHC RX 250: Performed by: NURSE PRACTITIONER

## 2019-08-19 PROCEDURE — 25000132 ZZH RX MED GY IP 250 OP 250 PS 637: Mod: GY | Performed by: NURSE ANESTHETIST, CERTIFIED REGISTERED

## 2019-08-19 PROCEDURE — 01NB0ZZ RELEASE LUMBAR NERVE, OPEN APPROACH: ICD-10-PCS | Performed by: NEUROLOGICAL SURGERY

## 2019-08-19 PROCEDURE — 71000014 ZZH RECOVERY PHASE 1 LEVEL 2 FIRST HR: Performed by: NEUROLOGICAL SURGERY

## 2019-08-19 PROCEDURE — 40000065 ZZH STATISTIC EKG NON-CHARGEABLE

## 2019-08-19 PROCEDURE — 37000009 ZZH ANESTHESIA TECHNICAL FEE, EACH ADDTL 15 MIN: Performed by: NEUROLOGICAL SURGERY

## 2019-08-19 PROCEDURE — 36000070 ZZH SURGERY LEVEL 5 EA 15 ADDTL MIN - UMMC: Performed by: NEUROLOGICAL SURGERY

## 2019-08-19 PROCEDURE — 0SP304Z REMOVAL OF INTERNAL FIXATION DEVICE FROM LUMBOSACRAL JOINT, OPEN APPROACH: ICD-10-PCS | Performed by: NEUROLOGICAL SURGERY

## 2019-08-19 PROCEDURE — 25800030 ZZH RX IP 258 OP 636: Performed by: STUDENT IN AN ORGANIZED HEALTH CARE EDUCATION/TRAINING PROGRAM

## 2019-08-19 PROCEDURE — 00000146 ZZHCL STATISTIC GLUCOSE BY METER IP

## 2019-08-19 PROCEDURE — 0SH304Z INSERTION OF INTERNAL FIXATION DEVICE INTO LUMBOSACRAL JOINT, OPEN APPROACH: ICD-10-PCS | Performed by: NEUROLOGICAL SURGERY

## 2019-08-19 PROCEDURE — 27210794 ZZH OR GENERAL SUPPLY STERILE: Performed by: NEUROLOGICAL SURGERY

## 2019-08-19 PROCEDURE — 93010 ELECTROCARDIOGRAM REPORT: CPT | Performed by: INTERNAL MEDICINE

## 2019-08-19 PROCEDURE — 25800030 ZZH RX IP 258 OP 636: Performed by: NURSE ANESTHETIST, CERTIFIED REGISTERED

## 2019-08-19 PROCEDURE — 25000125 ZZHC RX 250: Performed by: NURSE ANESTHETIST, CERTIFIED REGISTERED

## 2019-08-19 PROCEDURE — 40000170 ZZH STATISTIC PRE-PROCEDURE ASSESSMENT II: Performed by: NEUROLOGICAL SURGERY

## 2019-08-19 PROCEDURE — 27210995 ZZH RX 272: Performed by: NEUROLOGICAL SURGERY

## 2019-08-19 PROCEDURE — 25000566 ZZH SEVOFLURANE, EA 15 MIN: Performed by: NEUROLOGICAL SURGERY

## 2019-08-19 PROCEDURE — 25000128 H RX IP 250 OP 636: Performed by: NURSE ANESTHETIST, CERTIFIED REGISTERED

## 2019-08-19 PROCEDURE — 36000072 ZZH SURGERY LEVEL 5 W FLUORO 1ST 30 MIN - UMMC: Performed by: NEUROLOGICAL SURGERY

## 2019-08-19 PROCEDURE — A9270 NON-COVERED ITEM OR SERVICE: HCPCS | Mod: GY | Performed by: NURSE ANESTHETIST, CERTIFIED REGISTERED

## 2019-08-19 DEVICE — IMP SCR MEDT 5.5/6.0MM SOLERA 8.5X45MM MA 55840008545: Type: IMPLANTABLE DEVICE | Site: SPINE LUMBAR | Status: FUNCTIONAL

## 2019-08-19 DEVICE — IMP SCR SET MEDT SOLERA BREAK OFF 5.5MM TI 5540030: Type: IMPLANTABLE DEVICE | Site: SPINE LUMBAR | Status: FUNCTIONAL

## 2019-08-19 DEVICE — IMP ROD MEDT SOLERA CVD 5.5X40MM CHR 1555501040: Type: IMPLANTABLE DEVICE | Site: SPINE LUMBAR | Status: FUNCTIONAL

## 2019-08-19 RX ORDER — POLYETHYLENE GLYCOL 3350 17 G/17G
17 POWDER, FOR SOLUTION ORAL DAILY
Status: DISCONTINUED | OUTPATIENT
Start: 2019-08-20 | End: 2019-08-20 | Stop reason: HOSPADM

## 2019-08-19 RX ORDER — ONDANSETRON 4 MG/1
4 TABLET, ORALLY DISINTEGRATING ORAL EVERY 30 MIN PRN
Status: DISCONTINUED | OUTPATIENT
Start: 2019-08-19 | End: 2019-08-19 | Stop reason: HOSPADM

## 2019-08-19 RX ORDER — SODIUM CHLORIDE 9 MG/ML
INJECTION, SOLUTION INTRAVENOUS CONTINUOUS
Status: DISCONTINUED | OUTPATIENT
Start: 2019-08-19 | End: 2019-08-20 | Stop reason: HOSPADM

## 2019-08-19 RX ORDER — HYDROMORPHONE HYDROCHLORIDE 1 MG/ML
.3-.5 INJECTION, SOLUTION INTRAMUSCULAR; INTRAVENOUS; SUBCUTANEOUS
Status: DISCONTINUED | OUTPATIENT
Start: 2019-08-19 | End: 2019-08-20

## 2019-08-19 RX ORDER — FENTANYL CITRATE 50 UG/ML
25-50 INJECTION, SOLUTION INTRAMUSCULAR; INTRAVENOUS
Status: DISCONTINUED | OUTPATIENT
Start: 2019-08-19 | End: 2019-08-19 | Stop reason: HOSPADM

## 2019-08-19 RX ORDER — LIDOCAINE 40 MG/G
CREAM TOPICAL
Status: DISCONTINUED | OUTPATIENT
Start: 2019-08-19 | End: 2019-08-19 | Stop reason: HOSPADM

## 2019-08-19 RX ORDER — NALOXONE HYDROCHLORIDE 0.4 MG/ML
.1-.4 INJECTION, SOLUTION INTRAMUSCULAR; INTRAVENOUS; SUBCUTANEOUS
Status: DISCONTINUED | OUTPATIENT
Start: 2019-08-19 | End: 2019-08-20 | Stop reason: HOSPADM

## 2019-08-19 RX ORDER — SODIUM CHLORIDE, SODIUM LACTATE, POTASSIUM CHLORIDE, CALCIUM CHLORIDE 600; 310; 30; 20 MG/100ML; MG/100ML; MG/100ML; MG/100ML
INJECTION, SOLUTION INTRAVENOUS CONTINUOUS
Status: DISCONTINUED | OUTPATIENT
Start: 2019-08-19 | End: 2019-08-19 | Stop reason: HOSPADM

## 2019-08-19 RX ORDER — ALBUTEROL SULFATE 0.83 MG/ML
2.5 SOLUTION RESPIRATORY (INHALATION) ONCE
Status: DISCONTINUED | OUTPATIENT
Start: 2019-08-19 | End: 2019-08-19 | Stop reason: HOSPADM

## 2019-08-19 RX ORDER — LABETALOL 20 MG/4 ML (5 MG/ML) INTRAVENOUS SYRINGE
10-40 EVERY 10 MIN PRN
Status: DISCONTINUED | OUTPATIENT
Start: 2019-08-19 | End: 2019-08-20 | Stop reason: HOSPADM

## 2019-08-19 RX ORDER — ACETAMINOPHEN 325 MG/1
650 TABLET ORAL EVERY 4 HOURS PRN
Status: DISCONTINUED | OUTPATIENT
Start: 2019-08-22 | End: 2019-08-20 | Stop reason: HOSPADM

## 2019-08-19 RX ORDER — ONDANSETRON 2 MG/ML
4 INJECTION INTRAMUSCULAR; INTRAVENOUS EVERY 6 HOURS PRN
Status: DISCONTINUED | OUTPATIENT
Start: 2019-08-19 | End: 2019-08-20 | Stop reason: HOSPADM

## 2019-08-19 RX ORDER — PROPOFOL 10 MG/ML
INJECTION, EMULSION INTRAVENOUS PRN
Status: DISCONTINUED | OUTPATIENT
Start: 2019-08-19 | End: 2019-08-19

## 2019-08-19 RX ORDER — ACETAMINOPHEN 325 MG/1
975 TABLET ORAL EVERY 8 HOURS
Status: DISCONTINUED | OUTPATIENT
Start: 2019-08-19 | End: 2019-08-20 | Stop reason: HOSPADM

## 2019-08-19 RX ORDER — CLINDAMYCIN PHOSPHATE 900 MG/50ML
900 INJECTION, SOLUTION INTRAVENOUS
Status: COMPLETED | OUTPATIENT
Start: 2019-08-19 | End: 2019-08-19

## 2019-08-19 RX ORDER — SODIUM CHLORIDE, SODIUM LACTATE, POTASSIUM CHLORIDE, CALCIUM CHLORIDE 600; 310; 30; 20 MG/100ML; MG/100ML; MG/100ML; MG/100ML
INJECTION, SOLUTION INTRAVENOUS CONTINUOUS PRN
Status: DISCONTINUED | OUTPATIENT
Start: 2019-08-19 | End: 2019-08-19

## 2019-08-19 RX ORDER — CLINDAMYCIN PHOSPHATE 900 MG/50ML
900 INJECTION, SOLUTION INTRAVENOUS SEE ADMIN INSTRUCTIONS
Status: DISCONTINUED | OUTPATIENT
Start: 2019-08-19 | End: 2019-08-19 | Stop reason: HOSPADM

## 2019-08-19 RX ORDER — FENTANYL CITRATE 50 UG/ML
INJECTION, SOLUTION INTRAMUSCULAR; INTRAVENOUS PRN
Status: DISCONTINUED | OUTPATIENT
Start: 2019-08-19 | End: 2019-08-19

## 2019-08-19 RX ORDER — OXYCODONE HYDROCHLORIDE 5 MG/1
5-10 TABLET ORAL
Status: DISCONTINUED | OUTPATIENT
Start: 2019-08-19 | End: 2019-08-20

## 2019-08-19 RX ORDER — LIDOCAINE 40 MG/G
CREAM TOPICAL
Status: DISCONTINUED | OUTPATIENT
Start: 2019-08-19 | End: 2019-08-20 | Stop reason: HOSPADM

## 2019-08-19 RX ORDER — AMOXICILLIN 250 MG
1 CAPSULE ORAL 2 TIMES DAILY
Status: DISCONTINUED | OUTPATIENT
Start: 2019-08-19 | End: 2019-08-20 | Stop reason: HOSPADM

## 2019-08-19 RX ORDER — ALBUTEROL SULFATE 90 UG/1
2 AEROSOL, METERED RESPIRATORY (INHALATION) 4 TIMES DAILY
Status: DISCONTINUED | OUTPATIENT
Start: 2019-08-19 | End: 2019-08-20 | Stop reason: HOSPADM

## 2019-08-19 RX ORDER — ONDANSETRON 2 MG/ML
INJECTION INTRAMUSCULAR; INTRAVENOUS PRN
Status: DISCONTINUED | OUTPATIENT
Start: 2019-08-19 | End: 2019-08-19

## 2019-08-19 RX ORDER — ALBUTEROL SULFATE 90 UG/1
AEROSOL, METERED RESPIRATORY (INHALATION) PRN
Status: DISCONTINUED | OUTPATIENT
Start: 2019-08-19 | End: 2019-08-19

## 2019-08-19 RX ORDER — DIAZEPAM 5 MG
5 TABLET ORAL EVERY 6 HOURS PRN
Status: DISCONTINUED | OUTPATIENT
Start: 2019-08-19 | End: 2019-08-20 | Stop reason: HOSPADM

## 2019-08-19 RX ORDER — LIDOCAINE HYDROCHLORIDE 20 MG/ML
INJECTION, SOLUTION INFILTRATION; PERINEURAL PRN
Status: DISCONTINUED | OUTPATIENT
Start: 2019-08-19 | End: 2019-08-19

## 2019-08-19 RX ORDER — ACETAMINOPHEN 325 MG/1
975 TABLET ORAL ONCE
Status: DISCONTINUED | OUTPATIENT
Start: 2019-08-19 | End: 2019-08-19 | Stop reason: HOSPADM

## 2019-08-19 RX ORDER — ALBUTEROL SULFATE 0.83 MG/ML
1.25 SOLUTION RESPIRATORY (INHALATION) DAILY PRN
Status: DISCONTINUED | OUTPATIENT
Start: 2019-08-19 | End: 2019-08-20 | Stop reason: HOSPADM

## 2019-08-19 RX ORDER — AMOXICILLIN 250 MG
2 CAPSULE ORAL 2 TIMES DAILY
Status: DISCONTINUED | OUTPATIENT
Start: 2019-08-19 | End: 2019-08-20 | Stop reason: HOSPADM

## 2019-08-19 RX ORDER — ONDANSETRON 2 MG/ML
4 INJECTION INTRAMUSCULAR; INTRAVENOUS EVERY 30 MIN PRN
Status: DISCONTINUED | OUTPATIENT
Start: 2019-08-19 | End: 2019-08-19 | Stop reason: HOSPADM

## 2019-08-19 RX ORDER — ONDANSETRON 4 MG/1
4 TABLET, ORALLY DISINTEGRATING ORAL EVERY 6 HOURS PRN
Status: DISCONTINUED | OUTPATIENT
Start: 2019-08-19 | End: 2019-08-20 | Stop reason: HOSPADM

## 2019-08-19 RX ORDER — LIDOCAINE HYDROCHLORIDE AND EPINEPHRINE 10; 10 MG/ML; UG/ML
INJECTION, SOLUTION INFILTRATION; PERINEURAL PRN
Status: DISCONTINUED | OUTPATIENT
Start: 2019-08-19 | End: 2019-08-19 | Stop reason: HOSPADM

## 2019-08-19 RX ORDER — HYDRALAZINE HYDROCHLORIDE 20 MG/ML
10-20 INJECTION INTRAMUSCULAR; INTRAVENOUS EVERY 30 MIN PRN
Status: DISCONTINUED | OUTPATIENT
Start: 2019-08-19 | End: 2019-08-20 | Stop reason: HOSPADM

## 2019-08-19 RX ORDER — HYDROMORPHONE HYDROCHLORIDE 1 MG/ML
.3-.5 INJECTION, SOLUTION INTRAMUSCULAR; INTRAVENOUS; SUBCUTANEOUS EVERY 5 MIN PRN
Status: DISCONTINUED | OUTPATIENT
Start: 2019-08-19 | End: 2019-08-19 | Stop reason: HOSPADM

## 2019-08-19 RX ADMIN — MIDAZOLAM 2 MG: 1 INJECTION INTRAMUSCULAR; INTRAVENOUS at 12:40

## 2019-08-19 RX ADMIN — ALBUTEROL SULFATE 6 PUFF: 90 AEROSOL, METERED RESPIRATORY (INHALATION) at 15:41

## 2019-08-19 RX ADMIN — HYDROMORPHONE HYDROCHLORIDE 0.5 MG: 1 INJECTION, SOLUTION INTRAMUSCULAR; INTRAVENOUS; SUBCUTANEOUS at 15:57

## 2019-08-19 RX ADMIN — SUGAMMADEX 200 MG: 100 INJECTION, SOLUTION INTRAVENOUS at 15:48

## 2019-08-19 RX ADMIN — FENTANYL CITRATE 50 MCG: 50 INJECTION, SOLUTION INTRAMUSCULAR; INTRAVENOUS at 12:56

## 2019-08-19 RX ADMIN — FENTANYL CITRATE 100 MCG: 50 INJECTION, SOLUTION INTRAMUSCULAR; INTRAVENOUS at 13:19

## 2019-08-19 RX ADMIN — FENTANYL CITRATE 50 MCG: 50 INJECTION, SOLUTION INTRAMUSCULAR; INTRAVENOUS at 15:14

## 2019-08-19 RX ADMIN — CLINDAMYCIN PHOSPHATE 900 MG: 18 INJECTION, SOLUTION INTRAVENOUS at 13:04

## 2019-08-19 RX ADMIN — FENTANYL CITRATE 100 MCG: 50 INJECTION, SOLUTION INTRAMUSCULAR; INTRAVENOUS at 12:40

## 2019-08-19 RX ADMIN — ONDANSETRON 4 MG: 2 INJECTION INTRAMUSCULAR; INTRAVENOUS at 15:15

## 2019-08-19 RX ADMIN — ROCURONIUM BROMIDE 10 MG: 10 INJECTION INTRAVENOUS at 14:31

## 2019-08-19 RX ADMIN — PHENYLEPHRINE HYDROCHLORIDE 100 MCG: 10 INJECTION INTRAVENOUS at 13:30

## 2019-08-19 RX ADMIN — SODIUM CHLORIDE, POTASSIUM CHLORIDE, SODIUM LACTATE AND CALCIUM CHLORIDE: 600; 310; 30; 20 INJECTION, SOLUTION INTRAVENOUS at 12:40

## 2019-08-19 RX ADMIN — PHENYLEPHRINE HYDROCHLORIDE 200 MCG: 10 INJECTION INTRAVENOUS at 13:23

## 2019-08-19 RX ADMIN — ROCURONIUM BROMIDE 10 MG: 10 INJECTION INTRAVENOUS at 15:14

## 2019-08-19 RX ADMIN — PROPOFOL 100 MG: 10 INJECTION, EMULSION INTRAVENOUS at 12:56

## 2019-08-19 RX ADMIN — SODIUM CHLORIDE, POTASSIUM CHLORIDE, SODIUM LACTATE AND CALCIUM CHLORIDE: 600; 310; 30; 20 INJECTION, SOLUTION INTRAVENOUS at 13:05

## 2019-08-19 RX ADMIN — ROCURONIUM BROMIDE 20 MG: 10 INJECTION INTRAVENOUS at 13:38

## 2019-08-19 RX ADMIN — SODIUM CHLORIDE: 9 INJECTION, SOLUTION INTRAVENOUS at 17:00

## 2019-08-19 RX ADMIN — LIDOCAINE HYDROCHLORIDE 100 MG: 20 INJECTION, SOLUTION INFILTRATION; PERINEURAL at 12:56

## 2019-08-19 RX ADMIN — ROCURONIUM BROMIDE 50 MG: 10 INJECTION INTRAVENOUS at 12:56

## 2019-08-19 RX ADMIN — FENTANYL CITRATE 50 MCG: 50 INJECTION, SOLUTION INTRAMUSCULAR; INTRAVENOUS at 14:11

## 2019-08-19 ASSESSMENT — PAIN DESCRIPTION - DESCRIPTORS
DESCRIPTORS: SORE

## 2019-08-19 ASSESSMENT — ACTIVITIES OF DAILY LIVING (ADL): ADLS_ACUITY_SCORE: 13

## 2019-08-19 ASSESSMENT — MIFFLIN-ST. JEOR: SCORE: 1525.56

## 2019-08-19 NOTE — ANESTHESIA POSTPROCEDURE EVALUATION
Anesthesia POST Procedure Evaluation    Patient: Lory Duncan   MRN:     [id number] Gender:   female   Age:    58 year old :      1960        Preoperative Diagnosis: Lumbar Disc Disease With Radiculopathy   Procedure(s):  Removal Of Left Sided Instrumentation At Lumbar 5-Sacral 1, Total Facetectomy, Replacement Of Instrumentation **Latex Allergy**   Postop Comments: No value filed.       Anesthesia Type:  Not documented  No value filed.    Reportable Event: NO     PAIN: Uncomplicated   Sign Out status: Comfortable, Well controlled pain     PONV: No PONV   Sign Out status:  No Nausea or Vomiting     Neuro/Psych: Uneventful perioperative course   Sign Out Status: Preoperative baseline; Age appropriate mentation     Airway/Resp.: Uneventful perioperative course   Sign Out Status: Non labored breathing, age appropriate RR; Resp. Status within EXPECTED Parameters     CV: Uneventful perioperative course   Sign Out status: Appropriate BP and perfusion indices; Appropriate HR/Rhythm     Disposition:   Sign Out in:  PACU  Recovery Course: Uneventful  Follow-Up: Not required           Last Anesthesia Record Vitals:  CRNA VITALS  2019 1523 - 2019 1623      2019             Resp Rate (set):  14          Last PACU Vitals:  Vitals Value Taken Time   /90 2019  5:30 PM   Temp 36.6  C (97.9  F) 2019  5:00 PM   Pulse 78 2019  5:30 PM   Resp 18 2019  5:00 PM   SpO2 95 % 2019  5:35 PM   Temp src     NIBP     Pulse     SpO2     Resp     Temp     Ht Rate     Temp 2     Vitals shown include unvalidated device data.      Electronically Signed By: Alfonzo Wilson MD, 2019, 6:13 PM

## 2019-08-19 NOTE — OP NOTE
Procedure Date: 08/19/2019      STAFF SURGEON:   Shannan Holman MD.        RESIDENT SURGEON:  John Leschke, MD.       PREOPERATIVE DIAGNOSIS:  Lumbar disc disease with L5 radiculopathy, left-sided.       POSTOPERATIVE DIAGNOSIS:  Lumbar disc disease with L5 radiculopathy, left-sided.      PROCEDURES PERFORMED:     1.  Revision of fusion, L5 to S1, left.   2.  Replacement of pedicle screws, L5 and S1.   3.  Left facetectomy.   4.  Left L5 to S1 posterior lateral arthrodesis.      ANESTHESIA:  General endotracheal.      ESTIMATED BLOOD LOSS:  20 mL.      INDICATIONS FOR THE OPERATION:  The patient is a 58-year-old who has had several lumbar surgeries.  Most recently she had an L5 to S1 fusion approximately 1 year ago.  She has had recurrence of left leg pain that radiates in an L5 distribution.  An L5 epidural steroid injection on the left showed dramatic improvement.  Her CT scan showed hyperdense material at the foramen of L5 and S1.  This was found to emanate from the disc space and likely bony overgrowth from her interbody fusion.  After careful discussion of the risks and benefits with the patient, consent was obtained in written format.      DESCRIPTION OF PROCEDURE:  The patient was brought to the operating room where general endotracheal anesthesia was induced.  IV access was obtained.  The patient was positioned prone on a Edy frame with her arms extended gently.  All pressure points were padded.  The low back was prepped and draped in a standard fashion.  Clindamycin was administered for prophylaxis and SCDs had been applied.  Lidocaine with epinephrine was infiltrated in the planned incision.      After conducting appropriate timeout, her old incision was opened and extended slightly in both the cranial and caudal direction.  This incision was well off midline.  A medial trajectory was carried down past the iliac spine and ultimately identified the screws at L5 and S1 with the sabas connecting the hardware.   The hardware was exposed with Bovie cautery and ultimately removed with the hexagonal screwdriver.  The screws were removed without difficulty.  Bony exposure over the lamina at L5 and the transverse process in the facet was carried out judiciously.  Medially inferior and anterior to the left L5 screw was the bone material on CT that was compressing the foramen.  This region was explored carefully.  First, the high speed drill was used to expand the foramen.  Then, a curved curet was used to remove fragments of bone.  Then meticulous use of a #2 Kerrison instrument was used to follow the nerve root out laterally as it exited the foramen.  These steps were repeated until we were comfortable with the amount of decompression at the nerve root.        The hardware was then measured and replaced.  The trajectory for the screw replacement was delineated with our suction and then the screws were freehand passed in the same trajectory.  Two 8.5 x 45 mm Solera screws were placed at L5 and S1.  A titanium sabas was used to connect the 2 and they were final tightened before our final x-ray.  Once we were satisfied with the screw placement, the decompression and hemostasis, the wound was copiously irrigated.        Fascia and muscle were then closed with 0 Vicryl sutures in an inverted interrupted fashion.  The deep tissue and subcutaneous layers were closed with 2-0 Vicryl sutures in an inverted interrupted fashion.  A 3-0 nylon was used at the skin surface in a running continuous fashion.  The wound was then cleaned with wet and dry sponges followed by ChloraPrep.  A sterile dressing was placed over the incision thereafter.  The drapes were taken down.  The patient was turned back in the hospital bed.  She was extubated prior to being transferred to the postanesthesia care unit.  Her Anne was removed.  Instrument, needle and sponge counts were correct at the end of the operation.  There were no immediate complications during  the procedure.         GAIL JACOBSEN MD       As dictated by JOHN M. LESCHKE, MD            D: 2019   T: 2019   MT:       Name:     HERON MCCOY   MRN:      [id number]        Account:        [id number]   :      1960           Procedure Date: 2019      Document: [id number]

## 2019-08-19 NOTE — OR NURSING
Dr. Murray notified that pt is refusing CXR and labs. MD reviewed chart and is aware. MD will cancel.

## 2019-08-19 NOTE — BRIEF OP NOTE
[facility], [place]    Brief Operative Note    Pre-operative diagnosis: Lumbar Disc Disease With Radiculopathy  Post-operative diagnosis sme  Procedure: Procedure(s):  Removal Of Left Sided Instrumentation At Lumbar 5-Sacral 1, Total Facetectomy, Replacement Of Instrumentation **Latex Allergy**  Surgeon: Surgeon(s) and Role:     * Shannan Holman MD - Primary     * Leschke, John M, MD - Resident - Assisting  Anesthesia: General   Estimated blood loss: 20cc  Drains: None  Specimens: * No specimens in log *  Findings:   well decompressed L5-S1 foramen.  Complications: None.  Implants:    Implant Name Type Inv. Item Serial No.  Lot No. LRB No. Used   IMP SCR SET MEDT SOLERA BREAK OFF 5.5MM TI [id number] Metallic Hardware/[place] IMP SCR SET MEDT SOLERA BREAK OFF 5.5MM TI [id number]  [employer] [id number] Left 2   IMP SCR MEDT 5.5/6.0MM SOLERA 8.5X45MM MA [id number] Metallic Hardware/[place] IMP SCR MEDT 5.5/6.0MM SOLERA 8.5X45MM MA [id number]  [employer] [id number] Left 2   IMP BETTY MEDT SOLERA CVD 5.5X40MM CHR [id number] Metallic Hardware/[place] IMP BETTY MEDT SOLERA CVD 5.5X40MM CHR [id number]  [employer] [id number] Left 1

## 2019-08-19 NOTE — ANESTHESIA CARE TRANSFER NOTE
Patient: Lory Duncan    Procedure(s):  Removal Of Left Sided Instrumentation At Lumbar 5-Sacral 1, Total Facetectomy, Replacement Of Instrumentation **Latex Allergy**    Diagnosis: Lumbar Disc Disease With Radiculopathy  Diagnosis Additional Information: No value filed.    Anesthesia Type:   No value filed.     Note:  Airway :Face Mask  Patient transferred to:PACU  Comments: Anesthesia Care Transfer Note    Patient: Lory Duncan    Transferred to: PACU    Patient vital signs: stable    Airway: none    Monitors on, VSS, pt. Stable, Report given to PACU JUAN CARLOS Brownlee CRNA  8/19/2019 3:58 PM      Handoff Report: Identifed the Patient, Identified the Reponsible Provider, Reviewed the pertinent medical history, Discussed the surgical course, Reviewed Intra-OP anesthesia mangement and issues during anesthesia, Set expectations for post-procedure period and Allowed opportunity for questions and acknowledgement of understanding      Vitals: (Last set prior to Anesthesia Care Transfer)    CRNA VITALS  8/19/2019 1523 - 8/19/2019 1558      8/19/2019             Resp Rate (set):  14                Electronically Signed By: LEISA Griffin CRNA  August 19, 2019  3:58 PM

## 2019-08-20 ENCOUNTER — PATIENT OUTREACH (OUTPATIENT)
Dept: CARE COORDINATION | Facility: CLINIC | Age: 59
End: 2019-08-20

## 2019-08-20 ENCOUNTER — APPOINTMENT (OUTPATIENT)
Dept: GENERAL RADIOLOGY | Facility: CLINIC | Age: 59
DRG: 517 | End: 2019-08-20
Attending: NEUROLOGICAL SURGERY
Payer: MEDICARE

## 2019-08-20 VITALS
HEART RATE: 106 BPM | BODY MASS INDEX: 29.9 KG/M2 | RESPIRATION RATE: 16 BRPM | TEMPERATURE: 98.4 F | HEIGHT: 68 IN | WEIGHT: 197.31 LBS | SYSTOLIC BLOOD PRESSURE: 113 MMHG | OXYGEN SATURATION: 94 % | DIASTOLIC BLOOD PRESSURE: 74 MMHG

## 2019-08-20 LAB
ANION GAP SERPL CALCULATED.3IONS-SCNC: 7 MMOL/L (ref 3–14)
BUN SERPL-MCNC: 17 MG/DL (ref 7–30)
CALCIUM SERPL-MCNC: 8 MG/DL (ref 8.5–10.1)
CHLORIDE SERPL-SCNC: 108 MMOL/L (ref 94–109)
CO2 SERPL-SCNC: 28 MMOL/L (ref 20–32)
CREAT SERPL-MCNC: 0.58 MG/DL (ref 0.52–1.04)
GFR SERPL CREATININE-BSD FRML MDRD: >90 ML/MIN/{1.73_M2}
GLUCOSE SERPL-MCNC: 166 MG/DL (ref 70–99)
INTERPRETATION ECG - MUSE: NORMAL
MAGNESIUM SERPL-MCNC: 1.9 MG/DL (ref 1.6–2.3)
POTASSIUM SERPL-SCNC: 4.3 MMOL/L (ref 3.4–5.3)
SODIUM SERPL-SCNC: 143 MMOL/L (ref 133–144)

## 2019-08-20 PROCEDURE — 80048 BASIC METABOLIC PNL TOTAL CA: CPT | Performed by: STUDENT IN AN ORGANIZED HEALTH CARE EDUCATION/TRAINING PROGRAM

## 2019-08-20 PROCEDURE — 25000132 ZZH RX MED GY IP 250 OP 250 PS 637: Mod: GY | Performed by: NURSE PRACTITIONER

## 2019-08-20 PROCEDURE — 83735 ASSAY OF MAGNESIUM: CPT | Performed by: STUDENT IN AN ORGANIZED HEALTH CARE EDUCATION/TRAINING PROGRAM

## 2019-08-20 PROCEDURE — 36415 COLL VENOUS BLD VENIPUNCTURE: CPT | Performed by: STUDENT IN AN ORGANIZED HEALTH CARE EDUCATION/TRAINING PROGRAM

## 2019-08-20 PROCEDURE — 25000132 ZZH RX MED GY IP 250 OP 250 PS 637: Mod: GY | Performed by: STUDENT IN AN ORGANIZED HEALTH CARE EDUCATION/TRAINING PROGRAM

## 2019-08-20 PROCEDURE — 40000893 ZZH STATISTIC PT IP EVAL DEFER

## 2019-08-20 PROCEDURE — 72100 X-RAY EXAM L-S SPINE 2/3 VWS: CPT

## 2019-08-20 PROCEDURE — A9270 NON-COVERED ITEM OR SERVICE: HCPCS | Mod: GY | Performed by: STUDENT IN AN ORGANIZED HEALTH CARE EDUCATION/TRAINING PROGRAM

## 2019-08-20 RX ORDER — MODAFINIL 200 MG/1
200 TABLET ORAL
Refills: 0 | COMMUNITY
Start: 2019-08-20

## 2019-08-20 RX ORDER — AMOXICILLIN 250 MG
1 CAPSULE ORAL 2 TIMES DAILY
Qty: 30 TABLET | Refills: 0 | Status: SHIPPED | OUTPATIENT
Start: 2019-08-20

## 2019-08-20 RX ORDER — HYDROCODONE BITARTRATE AND ACETAMINOPHEN 5; 325 MG/1; MG/1
1 TABLET ORAL EVERY 6 HOURS PRN
Status: DISCONTINUED | OUTPATIENT
Start: 2019-08-20 | End: 2019-08-20 | Stop reason: HOSPADM

## 2019-08-20 RX ORDER — POLYETHYLENE GLYCOL 3350 17 G/17G
17 POWDER, FOR SOLUTION ORAL DAILY
Qty: 14 PACKET | Refills: 0 | Status: SHIPPED | OUTPATIENT
Start: 2019-08-21

## 2019-08-20 RX ORDER — HYDROCODONE BITARTRATE AND ACETAMINOPHEN 7.5; 325 MG/1; MG/1
1 TABLET ORAL EVERY 6 HOURS PRN
Status: DISCONTINUED | OUTPATIENT
Start: 2019-08-20 | End: 2019-08-20

## 2019-08-20 RX ORDER — HYDROCODONE BITARTRATE AND ACETAMINOPHEN 5; 325 MG/1; MG/1
1 TABLET ORAL EVERY 6 HOURS PRN
Qty: 45 TABLET | Refills: 0 | Status: SHIPPED | OUTPATIENT
Start: 2019-08-20

## 2019-08-20 RX ADMIN — ALBUTEROL SULFATE 2 PUFF: 90 AEROSOL, METERED RESPIRATORY (INHALATION) at 07:54

## 2019-08-20 RX ADMIN — HYDROCODONE BITARTRATE AND ACETAMINOPHEN 1 TABLET: 5; 325 TABLET ORAL at 07:54

## 2019-08-20 ASSESSMENT — ACTIVITIES OF DAILY LIVING (ADL)
ADLS_ACUITY_SCORE: 11

## 2019-08-20 ASSESSMENT — VISUAL ACUITY: OU: NORMAL ACUITY

## 2019-08-20 NOTE — PLAN OF CARE
Status: POD#1 Removal of L sided instrumentation at L5-S1.  Vitals: VSS on RA. Pt refused 4AM VS.   Neuros: A&Ox4, denies N/T. All extremities 5/5. Refused 4AM neuro check.   IV: PIV SL.   Resp/trach: Current smoker, infrequent cough.   Diet: Regular.  Bowel status: Passing flatus. No BM overnight.  : Voiding w/o difficulty.  Skin: Dressing marked w/ drainage. Unchanged.  Pain: Back stiffness.   Activity: Up ind. In room.  Social: No visitors overnight.  Plan: Standing XRays completed. Plan to discharge today.

## 2019-08-20 NOTE — PROGRESS NOTES
Pt discharged home with SO. Pt was restless, agitated and ready to leave. All pts belongings sent w/ pt. Pt encouraged to  [place] from discharge pharmacy. Discharge paperwork completed, all questions answered. Pt encouraged to attend follow up appointments.

## 2019-08-20 NOTE — PLAN OF CARE
Status: POD# 0 Removal Of Left Sided Instrumentation At L5-S1  Vitals: VSS on Ra, sats in mid 90's, intermittently tachy 100-109. Refused capnography and cont. Pulse ox.  Neuros: A&Ox4, denies n/t, All extremities 5/5.  IV: PIV Sl'd. Refused IV fluids stating she will get plenty of fluid orally.  Resp/trach: WNL. Infrequent cough, current smoker.  Diet: Regular diet tolerated  Bowel status: No BM this shift  : Voiding without difficulty in BR  Skin: Incision w/moderate drainage, dressing marked and unchanged.  Pain: Denies  Activity: SBA  Social: Significant other visited this shift  Plan: Continue to monitor and follow POC. Refused XR this evening, will get it done in morning, and may be discharging tomorrow.

## 2019-08-20 NOTE — PLAN OF CARE
6A-PT: Defer: PT consult received and appreciated. Per chart review and discussion with interdisciplinary team and patient, pt with no IP therapy needs. Up indep. Plan to dc home, has support at home. Pt is aware of spinal precautions. PT to defer. Will complete orders, please re-consult if pt has change in status.

## 2019-08-20 NOTE — DISCHARGE SUMMARY
[facility] Discharge Summary and Instructions    Lory Duncan MRN# [id number]   Age: 58 year old YOB: 1960     Date of Admission:  8/19/2019  Date of Discharge::  8/20/2019  Admitting Physician:  Shannan Holman MD  Discharge Physician:  Shannan Holman MD          Admission Diagnoses:   Lumbar Disc Disease With Radiculopathy  Lumbar radiculopathy          Discharge Diagnosis:     Lumbar Disc Disease With Radiculopathy  Lumbar radiculopathy          Procedures:   8/19/19  1.  Revision of fusion, L5 to S1, left.   2.  Replacement of pedicle screws, L5 and S1.   3.  Left facetectomy.   4.  Left L5 to S1 posterior lateral arthrodesis.            Brief History of Illness:   The patient is a 58-year-old who has had several lumbar surgeries.  Most recently she had an L5 to S1 fusion approximately 1 year ago for right radicular pain. Immediately after her surgery, she had new left radicular leg pain that radiates in an L5 distribution.  An L5 epidural steroid injection on the left showed dramatic improvement.  Her CT scan showed hyperdense material at the foramen of L5 and S1.  This was found to emanate from the disc space and likely bony overgrowth from her interbody fusion.  After careful discussion of the risks and benefits with the patient, consent was obtained in written format. Patient has elected to undergo above-mentioned procedure.           Hospital Course:   Patient underwent above-mentioned procedure on 8/19. The operation was uncomplicated and the patient was admitted to the floor. On post operative day 1, 8/20, the patient was ambulating, voiding without a thomas, eating a regular diet, pain was well controlled, and she had flatus. The patient was eager to go home and therefore the patient was discharged to home on 8/20 after receiving maximum benefit from their hospital stay. Patient will follow up with Dr. Holman in 2 weeks.     PHYSICAL EXAM  General: Awake;  Alert, In No Acute  Distress  Pulm: Breathing Comfortably on room air  Mental status: Oriented x 3  Cranial Nerves: Cranial Nerves II-XII Intact Bilaterally  Strength: 5/5 in upper and lower extremities   Pronator Drift: Absent  Sensory: Intact to Light Touch. No paresthesias.   Reflexes: No Hyperreflexia, Pete s or Clonus Present; Toes Down-Going Bilaterally  INCISION: Covered, dry.           Discharge Medications:     Current Discharge Medication List      START taking these medications    Details   HYDROcodone-acetaminophen (NORCO) 5-325 MG tablet Take 1 tablet by mouth every 6 hours as needed for moderate to severe pain  Qty: 45 tablet, Refills: 0    Associated Diagnoses: Lumbar radiculopathy         CONTINUE these medications which have NOT CHANGED    Details   albuterol (PROAIR HFA) 108 (90 Base) MCG/ACT inhaler Inhale 2 puffs into the lungs 4 times daily       hypromellose (ISOPTO TEARS) 0.5 % SOLN ophthalmic solution 1-2 drops as needed      Loperamide HCl (IMODIUM A-D PO) Take 2-3 tablets by mouth as needed (Pt. last took 08/13/2019)      SALINE NASAL SPRAY NA [place] in nostril as needed      albuterol (ACCUNEB) 1.25 MG/3ML neb solution Inhale into the lungs as needed      estradiol (VAGIFEM) 10 MCG TABS vaginal tablet Place 10 mcg vaginally as needed      metroNIDAZOLE (FLAGYL) 500 MG tablet Take 500 mg by mouth as needed      sulfamethoxazole-trimethoprim (BACTRIM DS) 800-160 MG tablet Take 1 tablet by mouth as needed (Pt. has not taken in approximately 1 month 08/14/2019)       triamcinolone (KENALOG) 0.1 % external cream Apply topically as needed (Pt. has not used in approximately 1 month ago 08/14/2019)          STOP taking these medications       modafinil (PROVIGIL) 200 MG tablet Comments:   Reason for Stopping:         HYDROcodone-acetaminophen (LORTAB)  MG/15ML solution Comments:   Reason for Stopping:                       Discharge Instructions and Follow-Up:     Discharge diet: Regular   Discharge activity:  You may advance activity as tolerated. No strenuous exercise or heavy lifting greater than 10 lbs for 4 weeks or until seen and cleared in clinic.   Discharge follow-up: Follow-up with Dr. Shannan Holman MD in 2 weeks   Wound care: Ok to shower,however no scrubbing of the wound and no soaking of the wound, meaning no bathtubs or swimming pools. Pat dry only. Leave wound open to air.  Sutures are not absorbable and need to be removed in 2 weeks. If patient still at rehab by this time, the sutures may be removed by the rehab physician if he or she considers that the wound has healed completely.*      Please call  [phone] if you have: [phone]  1. increased pain, redness, drainage, swelling at your incision  2. fevers > 101.5 F degrees  3. with any questions or concerns.  You may reach the Neurosurgery clinic at [phone] during regular work hours. ER at [phone].    and ask for the Neurosurgery Resident on call at [phone], during off hours or weekends.         Discharge Disposition:     Discharged to home

## 2019-08-21 ENCOUNTER — MYC MEDICAL ADVICE (OUTPATIENT)
Dept: NEUROSURGERY | Facility: CLINIC | Age: 59
End: 2019-08-21

## 2019-08-21 NOTE — PROGRESS NOTES
[facility]: Post-Discharge Note  SITUATION                                                      Admission:    Admission Date: 08/19/19   Reason for Admission: Lumbar Disc Disease With Radiculopathy  Discharge:   Discharge Date: 08/20/19  Discharge Diagnosis: Lumbar Disc Disease With Radiculopathy  Discharge Service: Neurosurgery     BACKGROUND                                                      Lory Duncan is a 58 year old female who presents for pre-operative H & P in preparation for Removal of left sided instrumentation at L5/S1, total facetectomy, replacement of instrumentation with Dr. Holman on 8/19/19 at [facility].      The patient is a 58 year old woman who has a history of lower back pain that radiates to her lower legs. She had a L5-S1 fusion in the past which improved her symptoms on her right leg. 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 She continues to have pain that radiates down to her left leg. She has associated numbness and tingling. She was seen by dr. Holman on 3/21/19 for further management. They reviewed her imaging and discussed treatment options. She had JENNA and then returned on 6/13/19 as the patient had returned. The plan was for repeat JENNA and then the procedure as above.     ASSESSMENT      Discharge Assessment  Patient reports symptoms are: Unchanged(Patient reports she is having pain because she hasn't picked up her medication yet. )  Does the patient have all of their medications?: No(Patient needs the neurosurgery team to talk with her PCP about giving a RX for oxycodone. )  Does patient know what their new medications are for?: Yes  Does patient have a follow-up appointment scheduled?: Yes  Does patient have any other questions or concerns?: No    Post-op  Did the patient have surgery or a procedure: Yes  Incision: other(Still covered per  patient. No visible signs of redness, swelling, etc. )  Drainage: No  Bleeding: none  Fever: No  Chills: No  Redness: No  Warmth: No  Swelling: No  Incision site pain: No  Eating & Drinking: eating and drinking without complaints/concerns  Bowel Function: normal  Urinary Status: voiding without complaint/concerns    PLAN                                                      Outpatient Plan:      Follow-up with Dr. Shannan Holman MD in 2 weeks    Future Appointments   Date Time Provider Department Center   9/5/2019  2:00 PM Adelia Sweeney APRN [facility]           [place], [facility]

## 2019-08-30 ENCOUNTER — PATIENT OUTREACH (OUTPATIENT)
Dept: NEUROSURGERY | Facility: CLINIC | Age: 59
End: 2019-08-30

## 2019-08-30 NOTE — PROGRESS NOTES
[facility]: Post-Discharge Note  SITUATION                                                      Admission:    Admission Date: 08/19/19   Reason for Admission: Remove/Replace L5- S1 Instrumentation/Facetectomy  Discharge:    Discharge Date: 08/20/19   Discharge Diagnosis: Post-op   Discharge Service: Neurosurgery   Discharge Plan:  Routine Follow-up     BACKGROUND                                                      Neurosurgery Discharge Follow-Up      Responsible Attending Physician:   Date of Discharge:  8/20/2019  Discharge to:  Home    Current Status:  Phone call to patient to assess post-op coujrse.  No answer/Left message to return call prn.  .          ASSESSMENT      Patient reports symptoms are: Did not assess.  No answer/Left message to return call if needed.      Post-op  Did the patient have surgery or a procedure: Yes(Did not assess.  No answer/Left message to return call if needed)  Closure: non-dissolving    PLAN                                                      Outpatient Plan:  2,6,12 week and prn f/u    Future Appointments   Date Time Provider Department Center   9/5/2019  2:00 PM Adelia Sweeney APRN CNP [facility]           Gal Kaur RN

## 2019-09-06 DIAGNOSIS — Z98.1 S/P LUMBAR FUSION: Primary | ICD-10-CM

## 2020-03-11 ENCOUNTER — HEALTH MAINTENANCE LETTER (OUTPATIENT)
Age: 60
End: 2020-03-11

## 2021-01-03 ENCOUNTER — HEALTH MAINTENANCE LETTER (OUTPATIENT)
Age: 61
End: 2021-01-03

## 2021-04-25 ENCOUNTER — HEALTH MAINTENANCE LETTER (OUTPATIENT)
Age: 61
End: 2021-04-25

## 2021-10-10 ENCOUNTER — HEALTH MAINTENANCE LETTER (OUTPATIENT)
Age: 61
End: 2021-10-10

## 2022-03-26 ENCOUNTER — HEALTH MAINTENANCE LETTER (OUTPATIENT)
Age: 62
End: 2022-03-26

## 2022-05-21 ENCOUNTER — HEALTH MAINTENANCE LETTER (OUTPATIENT)
Age: 62
End: 2022-05-21

## 2022-09-18 ENCOUNTER — HEALTH MAINTENANCE LETTER (OUTPATIENT)
Age: 62
End: 2022-09-18

## 2023-06-04 ENCOUNTER — HEALTH MAINTENANCE LETTER (OUTPATIENT)
Age: 63
End: 2023-06-04

## (undated) DEVICE — CATH TRAY FOLEY 16FR SIL

## (undated) DEVICE — PACK NEURO MINOR UMMC SNE32MNMU4

## (undated) DEVICE — SU VICRYL 2-0 CT-2 CR 8X18" J726D

## (undated) DEVICE — DRAPE MAYO STAND 23X54 8337

## (undated) DEVICE — SOL NACL 0.9% IRRIG 1000ML BOTTLE 2F7124

## (undated) DEVICE — NDL BLUNT 17GA 1.5" 8881202330

## (undated) DEVICE — ESU ELEC BLADE 2.75" COATED/INSULATED E1455

## (undated) DEVICE — SPONGE COTTONOID 1/2X1/2" 80-1400

## (undated) DEVICE — DRAPE STERI TOWEL SM 1000

## (undated) DEVICE — ESU GROUND PAD ADULT W/CORD E7507

## (undated) DEVICE — PREP CHLORAPREP CLEAR 3ML 260400

## (undated) DEVICE — SYR 30ML LL W/O NDL 302832

## (undated) DEVICE — LINEN TOWEL PACK X30 5481

## (undated) DEVICE — SPONGE SURGIFOAM 01GM POWDER 1978

## (undated) DEVICE — SU VICRYL 0 CT-1 CR 8X18" J740D

## (undated) DEVICE — SU ETHILON 3-0 PS-1 18" 1663H

## (undated) DEVICE — DRSG PRIMAPORE 03 1/8X6" 66000318

## (undated) DEVICE — SUCTION MANIFOLD DORNOCH ULTRA CART UL-CL500

## (undated) DEVICE — GLOVE PROTEXIS MICRO 7.0  2D73PM70

## (undated) DEVICE — LINEN TOWEL PACK X6 WHITE 5487

## (undated) DEVICE — SPONGE SURGIFOAM 100 1974

## (undated) DEVICE — ESU ELEC BLADE 6" COATED/INSULATED E1455-6

## (undated) DEVICE — DRAPE C-ARM W/STRAPS 42X72" 07-CA104

## (undated) DEVICE — SOL WATER IRRIG 1000ML BOTTLE 2F7114

## (undated) DEVICE — GLOVE PROTEXIS BLUE W/NEU-THERA 7.0  2D73EB70

## (undated) DEVICE — PREP DURAPREP 26ML APL 8630

## (undated) RX ORDER — ALBUTEROL SULFATE 0.83 MG/ML
SOLUTION RESPIRATORY (INHALATION)
Status: DISPENSED
Start: 2019-08-19

## (undated) RX ORDER — BACITRACIN 50000 [IU]/1
INJECTION, POWDER, FOR SOLUTION INTRAMUSCULAR
Status: DISPENSED
Start: 2019-08-19

## (undated) RX ORDER — FENTANYL CITRATE 50 UG/ML
INJECTION, SOLUTION INTRAMUSCULAR; INTRAVENOUS
Status: DISPENSED
Start: 2019-08-19

## (undated) RX ORDER — CEFAZOLIN SODIUM 1 G/3ML
INJECTION, POWDER, FOR SOLUTION INTRAMUSCULAR; INTRAVENOUS
Status: DISPENSED
Start: 2019-08-19

## (undated) RX ORDER — SODIUM CHLORIDE 9 MG/ML
INJECTION, SOLUTION INTRAVENOUS
Status: DISPENSED
Start: 2019-08-19

## (undated) RX ORDER — PROPOFOL 10 MG/ML
INJECTION, EMULSION INTRAVENOUS
Status: DISPENSED
Start: 2019-08-19

## (undated) RX ORDER — EPHEDRINE SULFATE 50 MG/ML
INJECTION, SOLUTION INTRAMUSCULAR; INTRAVENOUS; SUBCUTANEOUS
Status: DISPENSED
Start: 2019-08-19

## (undated) RX ORDER — HYDROMORPHONE HYDROCHLORIDE 1 MG/ML
INJECTION, SOLUTION INTRAMUSCULAR; INTRAVENOUS; SUBCUTANEOUS
Status: DISPENSED
Start: 2019-08-19

## (undated) RX ORDER — DEXAMETHASONE SODIUM PHOSPHATE 4 MG/ML
INJECTION, SOLUTION INTRA-ARTICULAR; INTRALESIONAL; INTRAMUSCULAR; INTRAVENOUS; SOFT TISSUE
Status: DISPENSED
Start: 2019-08-19

## (undated) RX ORDER — LIDOCAINE HYDROCHLORIDE 20 MG/ML
INJECTION, SOLUTION EPIDURAL; INFILTRATION; INTRACAUDAL; PERINEURAL
Status: DISPENSED
Start: 2019-08-19

## (undated) RX ORDER — LIDOCAINE HYDROCHLORIDE AND EPINEPHRINE 10; 10 MG/ML; UG/ML
INJECTION, SOLUTION INFILTRATION; PERINEURAL
Status: DISPENSED
Start: 2019-08-19

## (undated) RX ORDER — CLINDAMYCIN PHOSPHATE 900 MG/50ML
INJECTION, SOLUTION INTRAVENOUS
Status: DISPENSED
Start: 2019-08-19

## (undated) RX ORDER — PHENYLEPHRINE HCL IN 0.9% NACL 1 MG/10 ML
SYRINGE (ML) INTRAVENOUS
Status: DISPENSED
Start: 2019-08-19